# Patient Record
Sex: FEMALE
[De-identification: names, ages, dates, MRNs, and addresses within clinical notes are randomized per-mention and may not be internally consistent; named-entity substitution may affect disease eponyms.]

---

## 2018-06-06 NOTE — DIAGNOSTIC IMAGING REPORT
EXAM: CT Abdomen and Pelvis WITHOUT contrast  

INDICATION: Left-sided abdominal/back pain.  

\S\STONE PROTOCOL

\S\[id number]

\S\1345

\S\Y 

COMPARISON: CT dated 7/27/2015

TECHNIQUE: Abdomen and pelvis were scanned utilizing a multidetector helical

scanner from the lung base to the pubic symphysis without administration of IV

contrast. Absence of intravenous contrast decreases sensitivity for detection

of focal lesions and vascular pathology. Coronal and sagittal reformations were

obtained. Renal stone protocol was performed. 

     IV CONTRAST: None

     ORAL CONTRAST: Water

            

COMPLICATIONS: None



RADIATION DOSE:

     Total DLP: 246.3 mGy*cm

     Estimated effective dose: (DLP x 0.015 x size factor) mSv

     CTDIvol has been reviewed. It is below the limits set by the Radiation

Protocol Committee (RPC).



FINDINGS:



LINES and TUBES: None.



LOWER THORAX:  Unremarkable



HEPATOBILIARY:      Unenhanced liver is unremarkable. No biliary ductal

dilation. 



GALLBLADDER: No radio-opaque stones or sludge.  No wall thickening.



SPLEEN: No splenomegaly. 



PANCREAS: No focal masses or ductal dilatation.  



ADRENALS: No adrenal nodules    



KIDNEYS/URETERS:  No hydronephrosis. Limited for evaluation of renal parenchyma

without intravenous contrast.  3 mm right midpole calculus. There are

additional punctate right inferior pole calculi. There are also punctate left

inferior pole calculi (series 3, image 61). 0.7 cm left midpole(series 3, image

57), and 0.4 cm anterior superior pole (series 3, image 51) cortical

hypodensities could represent calcifications or small  hemorrhagic cysts.



GI TRACT: No abnormal distention, wall thickening, or evidence of bowel

obstruction.  Similar to prior exam, stomach is distended with ingested

material.  Appendix is normal.



PELVIC ORGANS/BLADDER: Unremarkable.



LYMPH NODES: No definite lymphadenopathy.



VESSELS: Limited evaluation without intravenous contrast.



PERITONEUM / RETROPERITONEUM: No free air. Trace pelvic free fluid, likely

physiologic.



BONES: Unremarkable.



SOFT TISSUES: Unremarkable.            



IMPRESSION: 

1.  Bilateral subcentimeter nonobstructive renal calculi.

2.  No hydronephrosis or evidence of obstructive urolithiasis.



Signed by: Dr. Art Membreno MD on 6/6/2018 2:54 PM

## 2018-09-25 NOTE — DIAGNOSTIC IMAGING REPORT
EXAMINATION: CT of the abdomen and pelvis with contrast.



TECHNIQUE: 

Helical CT images of the abdomen and pelvis were performed from the lung bases

to the lesser trochanters after the intravenous administration of 100 cc of

Isovue 300 and the oral administration of none.  Coronal and sagittal

reformatted images were obtained.Dose modulation, iterative reconstruction,

and/or weight based adjustment of the mA/kV was utilized to reduce the

radiation dose to as low as reasonably achievable. 



COMPARISON: CT June 6, 2018



CLINICAL HISTORY:Right lower quadrant pain

     

DISCUSSION:



ABDOMEN/PELVIS:



LOWER THORAX:Unremarkable.



HEPATOBILIARY: No focal hepatic lesions.  No intra-or extrahepatic biliary

ductal dilation.  The gallbladder is normal.   



SPLEEN: No splenomegaly.



PANCREAS: No focal masses or ductal dilatation. 



ADRENALS: No adrenal nodules.



KIDNEYS/URETERS: Bilateral punctate renal calculi better seen on prior

noncontrast CT.



PELVIC ORGANS/BLADDER: The bladder is normal.  



PERITONEUM/RETROPERITONEUM: No free air or fluid.



LYMPH NODES: No intra-abdominal, retroperitoneal, pelvic or inguinal

lymphadenopathy.



VESSELS: The celiac trunk,superior and inferior mesenteric and bilateral  renal

arteries are patent  The portal, superior mesenteric and splenic veins are

patent.



GI TRACT: No distention or wall thickening. The appendix is normal.



BONES AND SOFT TISSUE: No bony destructive lesions.    No soft tissue

abnormalities.  



IMPRESSION: 



No acute CT finding.



Signed by: Dr. Andrew Palisch, M.D. on 9/25/2018 5:17 PM

## 2020-02-24 ENCOUNTER — HOSPITAL ENCOUNTER (EMERGENCY)
Dept: HOSPITAL 88 - ER | Age: 25
Discharge: HOME | End: 2020-02-24
Payer: SELF-PAY

## 2020-02-24 VITALS — HEIGHT: 57 IN | WEIGHT: 105 LBS | BODY MASS INDEX: 22.65 KG/M2

## 2020-02-24 VITALS — DIASTOLIC BLOOD PRESSURE: 50 MMHG | SYSTOLIC BLOOD PRESSURE: 108 MMHG

## 2020-02-24 DIAGNOSIS — N30.91: ICD-10-CM

## 2020-02-24 DIAGNOSIS — R11.2: Primary | ICD-10-CM

## 2020-02-24 DIAGNOSIS — K52.9: ICD-10-CM

## 2020-02-24 DIAGNOSIS — E86.0: ICD-10-CM

## 2020-02-24 LAB
ALBUMIN SERPL-MCNC: 4.3 G/DL (ref 3.5–5)
ALBUMIN/GLOB SERPL: 1.7 {RATIO} (ref 0.8–2)
ALP SERPL-CCNC: 62 IU/L (ref 40–150)
ALT SERPL-CCNC: 8 IU/L (ref 0–55)
ANION GAP SERPL CALC-SCNC: 12.7 MMOL/L (ref 8–16)
BACTERIA URNS QL MICRO: (no result) /HPF
BASOPHILS # BLD AUTO: 0 10*3/UL (ref 0–0.1)
BASOPHILS NFR BLD AUTO: 0.2 % (ref 0–1)
BILIRUB UR QL: (no result)
BUN SERPL-MCNC: 13 MG/DL (ref 7–26)
BUN/CREAT SERPL: 19 (ref 6–25)
CALCIUM SERPL-MCNC: 9 MG/DL (ref 8.4–10.2)
CHLORIDE SERPL-SCNC: 106 MMOL/L (ref 98–107)
CLARITY UR: (no result)
CO2 SERPL-SCNC: 24 MMOL/L (ref 22–29)
COLOR UR: YELLOW
DEPRECATED NEUTROPHILS # BLD AUTO: 6.5 10*3/UL (ref 2.1–6.9)
DEPRECATED RBC URNS MANUAL-ACNC: (no result) /HPF (ref 0–5)
EGFRCR SERPLBLD CKD-EPI 2021: > 60 ML/MIN (ref 60–?)
EOSINOPHIL # BLD AUTO: 0.2 10*3/UL (ref 0–0.4)
EOSINOPHIL NFR BLD AUTO: 2.4 % (ref 0–6)
EPI CELLS URNS QL MICRO: (no result) /LPF
ERYTHROCYTE [DISTWIDTH] IN CORD BLOOD: 11.6 % (ref 11.7–14.4)
GLOBULIN PLAS-MCNC: 2.6 G/DL (ref 2.3–3.5)
GLUCOSE SERPLBLD-MCNC: 74 MG/DL (ref 74–118)
HCG UR QL: NEGATIVE
HCT VFR BLD AUTO: 30.7 % (ref 34.2–44.1)
HGB BLD-MCNC: 10.8 G/DL (ref 12–16)
KETONES UR QL STRIP.AUTO: (no result)
LEUKOCYTE ESTERASE UR QL STRIP.AUTO: (no result)
LYMPHOCYTES # BLD: 1.7 10*3/UL (ref 1–3.2)
LYMPHOCYTES NFR BLD AUTO: 19 % (ref 18–39.1)
MAGNESIUM SERPL-MCNC: 1.7 MG/DL (ref 1.3–2.1)
MCH RBC QN AUTO: 34.3 PG (ref 28–32)
MCHC RBC AUTO-ENTMCNC: 35.2 G/DL (ref 31–35)
MCV RBC AUTO: 97.5 FL (ref 81–99)
MONOCYTES # BLD AUTO: 0.5 10*3/UL (ref 0.2–0.8)
MONOCYTES NFR BLD AUTO: 5.5 % (ref 4.4–11.3)
NEUTS SEG NFR BLD AUTO: 72.5 % (ref 38.7–80)
NITRITE UR QL STRIP.AUTO: NEGATIVE
PLATELET # BLD AUTO: 263 X10E3/UL (ref 140–360)
POTASSIUM SERPL-SCNC: 3.7 MMOL/L (ref 3.5–5.1)
PROT UR QL STRIP.AUTO: (no result)
RBC # BLD AUTO: 3.15 X10E6/UL (ref 3.6–5.1)
SODIUM SERPL-SCNC: 139 MMOL/L (ref 136–145)
SP GR UR STRIP: 1.02 (ref 1.01–1.02)
UROBILINOGEN UR STRIP-MCNC: 0.2 MG/DL (ref 0.2–1)
WBC #/AREA URNS HPF: (no result) /HPF (ref 0–5)

## 2020-02-24 PROCEDURE — 81025 URINE PREGNANCY TEST: CPT

## 2020-02-24 PROCEDURE — 83735 ASSAY OF MAGNESIUM: CPT

## 2020-02-24 PROCEDURE — 81001 URINALYSIS AUTO W/SCOPE: CPT

## 2020-02-24 PROCEDURE — 36415 COLL VENOUS BLD VENIPUNCTURE: CPT

## 2020-02-24 PROCEDURE — 80053 COMPREHEN METABOLIC PANEL: CPT

## 2020-02-24 PROCEDURE — 87086 URINE CULTURE/COLONY COUNT: CPT

## 2020-02-24 PROCEDURE — 99284 EMERGENCY DEPT VISIT MOD MDM: CPT

## 2020-02-24 PROCEDURE — 85025 COMPLETE CBC W/AUTO DIFF WBC: CPT

## 2020-02-24 NOTE — XMS REPORT
[facility] Summary

                             Created on: 2019



Angélica Salmeron

External Reference #: [id number]

: 1995

Sex: Female



Demographics







                          Address                   [address] [place]  [address]

 

                          Home Phone                [email or web address]

 

                          Preferred Language        English

 

                          Marital Status            S

 

                          Hoahaoism Affiliation     Unknown

 

                          Race                      Unknown

 

                          Ethnic Group              Unknown





Author







                          Author                    Admin, [place]

 

                          Organization              [facility]

 

                          Address                   [address]  [address]



 

                          Phone                     [phone]







Allergies, Adverse Reactions, Alerts







           Allergy Name    Reaction Description    Start Date    Severity    Status     Provider

 

           AMOXICILLIN                   Critical    Active     Leif Khoury MD







Conditions or Problems







        Problem Name    Problem Code    Onset Date    Status    Entry Date    Provider    Comment    Standard

 Description                            Annotate

 

        Std exposure    V01.6        Active        Leif Khoury MD            Contact with

 or exposure to venereal diseases        







Medication List







        Medication    Instructions    Start Date    Stop Date    Generic Name    NDC     Status    Provider

                                        Patient Instruction

 

           DIFLUCAN 150 MG ORAL TABLET    1 by mouth                   FLUCONAZOLE    [id number]

                Active          Leif Khoury MD                    Active







Vital Signs







           Date       Name       Value      Unit       Range      Description

 

               blood pressure, diastolic    62         mm[Hg]                BP cleaning

 

               blood pressure, systolic    105        mm[Hg]                BP sys

 

               height E&M    58         [in_us]               Bdy height

 

               pulse rate E&M    75         /min                  Heart rate

 

               respiratory rate E&M    18         /min                  Resp rate

 

               temperature E&M    98.7       [degF]                Body temperature

 

               weight E&M    98.40      [lb_av]               Weight Measured

 

               blood pressure, diastolic    62         mm[Hg]                BP cleaning

 

               blood pressure, systolic    105        mm[Hg]                BP sys

 

               height E&M    58         [in_us]               Bdy height

 

               pulse rate E&M    75         /min                  Heart rate

 

               respiratory rate E&M    18         /min                  Resp rate

 

               temperature E&M    98.7       [degF]                Body temperature

 

               weight E&M    98.40      [lb_av]               Weight Measured

 

               blood pressure, diastolic    73         mm[Hg]                BP cleaning

 

               blood pressure, systolic    107        mm[Hg]                BP sys

 

               height E&M    58         [in_us]               Bdy height

 

               pulse rate E&M    111        /min                  Heart rate

 

               respiratory rate E&M    14         /min                  Resp rate

 

               temperature E&M    98.4       [degF]                Body temperature

 

               weight E&M    102        [lb_av]               Weight Measured







Diagnostic Results







           Date       Name       Value      Unit       Range      Description

 

                                        Lab Report: Vaginitis/Vaginosis, DNA Probe, Ct, Ng, Trich vag by SANKET - Microbiology

 

 

               Neisseria gonorrhoeae DNA probe    Negative               Negative     

 

                                        Lab Report: Vaginitis/Vaginosis, DNA Probe, Ct, Ng, Trich vag by SANKET - Urinalysis

 

 

               trichomonas vaginalis, urine    Negative               Negative     

 

                                        Lab Report: RPR, Rfx Qn RPR/Confirm TP, Panel [id number], HBsAg Screen - Chemistry 

 

               hepatitis B surface antigen    Negative               Negative     

 

                                        Lab Report: Vaginitis/Vaginosis, DNA Probe, Ct, Ng, Trich vag by SANKET - Lab 

 

               chlamydia DNA probe    Negative               Negative     

 

                                        Lab Report: RPR, Rfx Qn RPR/Confirm TP, Panel [id number], HBsAg Screen - Serology 

 

               rapid plasma reagin antibody, serum    Non Reactive               Non Reactive    

 

 

                                        Lab Report: Vaginitis/Vaginosis, DNA Probe, Ct/GC SANKET, Pharyngeal - Microbiology

 

 

               Neisseria gonorrhoeae, throat culture    Negative               Negative     







Encounters







             Date         Encounter    Provider     Code         Facility

 

                     15:50:03 CDT    Est Patient Exp Problem - 88600    Anupamachandan Quintanilla MD                       CPT-13380                 [facility]

 

              10:59:06 CST    Ofc Vst, New Level III    Leif Khoury MD    CPT-35658    [facility]

 Medicine







Procedures







             Code         Procedure Name    Date         Entry Date    Standard Description

 

             CPT-    Azithromycin oral     11:33:21 CST

## 2021-04-12 ENCOUNTER — HOSPITAL ENCOUNTER (EMERGENCY)
Dept: HOSPITAL 88 - ER | Age: 26
Discharge: HOME | End: 2021-04-12
Payer: COMMERCIAL

## 2021-04-12 VITALS — BODY MASS INDEX: 22.65 KG/M2 | HEIGHT: 57 IN | WEIGHT: 105 LBS

## 2021-04-12 VITALS — DIASTOLIC BLOOD PRESSURE: 76 MMHG | SYSTOLIC BLOOD PRESSURE: 112 MMHG

## 2021-04-12 DIAGNOSIS — N20.0: ICD-10-CM

## 2021-04-12 DIAGNOSIS — M54.5: Primary | ICD-10-CM

## 2021-04-12 LAB
ALBUMIN SERPL-MCNC: 4.2 G/DL (ref 3.5–5)
ALBUMIN/GLOB SERPL: 1.4 {RATIO} (ref 0.8–2)
ALP SERPL-CCNC: 51 IU/L (ref 40–150)
ALT SERPL-CCNC: 9 IU/L (ref 0–55)
ANION GAP SERPL CALC-SCNC: 11.5 MMOL/L (ref 8–16)
BACTERIA URNS QL MICRO: (no result) /HPF
BASOPHILS # BLD AUTO: 0 10*3/UL (ref 0–0.1)
BASOPHILS NFR BLD AUTO: 0.5 % (ref 0–1)
BUN SERPL-MCNC: 11 MG/DL (ref 7–26)
BUN/CREAT SERPL: 15 (ref 6–25)
CALCIUM SERPL-MCNC: 8.8 MG/DL (ref 8.4–10.2)
CHLORIDE SERPL-SCNC: 107 MMOL/L (ref 98–107)
CLARITY UR: (no result)
CO2 SERPL-SCNC: 25 MMOL/L (ref 22–29)
COLOR UR: YELLOW
DEPRECATED NEUTROPHILS # BLD AUTO: 3.7 10*3/UL (ref 2.1–6.9)
DEPRECATED RBC URNS MANUAL-ACNC: (no result) /HPF (ref 0–5)
EGFRCR SERPLBLD CKD-EPI 2021: > 60 ML/MIN (ref 60–?)
EOSINOPHIL # BLD AUTO: 0.4 10*3/UL (ref 0–0.4)
EOSINOPHIL NFR BLD AUTO: 6.2 % (ref 0–6)
EPI CELLS URNS QL MICRO: (no result) /LPF
ERYTHROCYTE [DISTWIDTH] IN CORD BLOOD: 11.8 % (ref 11.7–14.4)
GLOBULIN PLAS-MCNC: 3 G/DL (ref 2.3–3.5)
GLUCOSE SERPLBLD-MCNC: 115 MG/DL (ref 74–118)
HCT VFR BLD AUTO: 34 % (ref 34.2–44.1)
HGB BLD-MCNC: 11.8 G/DL (ref 12–16)
KETONES UR QL STRIP.AUTO: NEGATIVE
LEUKOCYTE ESTERASE UR QL STRIP.AUTO: NEGATIVE
LYMPHOCYTES # BLD: 2 10*3/UL (ref 1–3.2)
LYMPHOCYTES NFR BLD AUTO: 30.6 % (ref 18–39.1)
MCH RBC QN AUTO: 33.7 PG (ref 28–32)
MCHC RBC AUTO-ENTMCNC: 34.7 G/DL (ref 31–35)
MCV RBC AUTO: 97.1 FL (ref 81–99)
MONOCYTES # BLD AUTO: 0.3 10*3/UL (ref 0.2–0.8)
MONOCYTES NFR BLD AUTO: 4.8 % (ref 4.4–11.3)
NEUTS SEG NFR BLD AUTO: 57.6 % (ref 38.7–80)
NITRITE UR QL STRIP.AUTO: NEGATIVE
PLATELET # BLD AUTO: 236 X10E3/UL (ref 140–360)
POTASSIUM SERPL-SCNC: 3.5 MMOL/L (ref 3.5–5.1)
PROT UR QL STRIP.AUTO: (no result)
RBC # BLD AUTO: 3.5 X10E6/UL (ref 3.6–5.1)
SODIUM SERPL-SCNC: 140 MMOL/L (ref 136–145)
SP GR UR STRIP: 1.02 (ref 1.01–1.02)
URATE CRY URNS QL MICRO: (no result)
UROBILINOGEN UR STRIP-MCNC: 0.2 MG/DL (ref 0.2–1)
WBC #/AREA URNS HPF: (no result) /HPF (ref 0–5)

## 2021-04-12 PROCEDURE — 99284 EMERGENCY DEPT VISIT MOD MDM: CPT

## 2021-04-12 PROCEDURE — 80053 COMPREHEN METABOLIC PANEL: CPT

## 2021-04-12 PROCEDURE — 87086 URINE CULTURE/COLONY COUNT: CPT

## 2021-04-12 PROCEDURE — 74176 CT ABD & PELVIS W/O CONTRAST: CPT

## 2021-04-12 PROCEDURE — 81001 URINALYSIS AUTO W/SCOPE: CPT

## 2021-04-12 PROCEDURE — 84702 CHORIONIC GONADOTROPIN TEST: CPT

## 2021-04-12 PROCEDURE — 85025 COMPLETE CBC W/AUTO DIFF WBC: CPT

## 2021-04-12 PROCEDURE — 36415 COLL VENOUS BLD VENIPUNCTURE: CPT
